# Patient Record
Sex: MALE | Race: WHITE | Employment: FULL TIME | URBAN - NONMETROPOLITAN AREA
[De-identification: names, ages, dates, MRNs, and addresses within clinical notes are randomized per-mention and may not be internally consistent; named-entity substitution may affect disease eponyms.]

---

## 2024-08-28 ENCOUNTER — APPOINTMENT (OUTPATIENT)
Dept: CT IMAGING | Age: 59
End: 2024-08-28
Payer: COMMERCIAL

## 2024-08-28 ENCOUNTER — HOSPITAL ENCOUNTER (EMERGENCY)
Age: 59
Discharge: HOME | End: 2024-08-28
Attending: EMERGENCY MEDICINE
Payer: COMMERCIAL

## 2024-08-28 VITALS
HEIGHT: 68 IN | HEART RATE: 87 BPM | BODY MASS INDEX: 28.79 KG/M2 | WEIGHT: 190 LBS | OXYGEN SATURATION: 99 % | TEMPERATURE: 98.3 F | SYSTOLIC BLOOD PRESSURE: 123 MMHG | DIASTOLIC BLOOD PRESSURE: 82 MMHG | RESPIRATION RATE: 17 BRPM

## 2024-08-28 DIAGNOSIS — F07.81 POST CONCUSSION SYNDROME: ICD-10-CM

## 2024-08-28 DIAGNOSIS — S09.90XA CLOSED HEAD INJURY, INITIAL ENCOUNTER: Primary | ICD-10-CM

## 2024-08-28 DIAGNOSIS — S09.93XA FACIAL INJURY, INITIAL ENCOUNTER: ICD-10-CM

## 2024-08-28 LAB
ANION GAP SERPL CALC-SCNC: 10 MEQ/L (ref 8–16)
BASOPHILS ABSOLUTE: 0.1 THOU/MM3 (ref 0–0.1)
BASOPHILS NFR BLD AUTO: 1 %
BUN SERPL-MCNC: 20 MG/DL (ref 7–22)
CALCIUM SERPL-MCNC: 9.2 MG/DL (ref 8.5–10.5)
CHLORIDE SERPL-SCNC: 101 MEQ/L (ref 98–111)
CO2 SERPL-SCNC: 28 MEQ/L (ref 23–33)
CREAT SERPL-MCNC: 0.8 MG/DL (ref 0.4–1.2)
DEPRECATED RDW RBC AUTO: 43.8 FL (ref 35–45)
EOSINOPHIL NFR BLD AUTO: 4.4 %
EOSINOPHILS ABSOLUTE: 0.2 THOU/MM3 (ref 0–0.4)
ERYTHROCYTE [DISTWIDTH] IN BLOOD BY AUTOMATED COUNT: 12.8 % (ref 11.5–14.5)
GFR SERPL CREATININE-BSD FRML MDRD: > 90 ML/MIN/1.73M2
GLUCOSE SERPL-MCNC: 99 MG/DL (ref 70–108)
HCT VFR BLD AUTO: 43.8 % (ref 42–52)
HGB BLD-MCNC: 14.6 GM/DL (ref 14–18)
IMM GRANULOCYTES # BLD AUTO: 0.01 THOU/MM3 (ref 0–0.07)
IMM GRANULOCYTES NFR BLD AUTO: 0.2 %
LYMPHOCYTES ABSOLUTE: 1 THOU/MM3 (ref 1–4.8)
LYMPHOCYTES NFR BLD AUTO: 19.1 %
MCH RBC QN AUTO: 31.1 PG (ref 26–33)
MCHC RBC AUTO-ENTMCNC: 33.3 GM/DL (ref 32.2–35.5)
MCV RBC AUTO: 93.2 FL (ref 80–94)
MONOCYTES ABSOLUTE: 0.5 THOU/MM3 (ref 0.4–1.3)
MONOCYTES NFR BLD AUTO: 10.5 %
NEUTROPHILS ABSOLUTE: 3.4 THOU/MM3 (ref 1.8–7.7)
NEUTROPHILS NFR BLD AUTO: 64.8 %
NRBC BLD AUTO-RTO: 0 /100 WBC
OSMOLALITY SERPL CALC.SUM OF ELEC: 280.2 MOSMOL/KG (ref 275–300)
PLATELET # BLD AUTO: 260 THOU/MM3 (ref 130–400)
PMV BLD AUTO: 9.1 FL (ref 9.4–12.4)
POTASSIUM SERPL-SCNC: 4.1 MEQ/L (ref 3.5–5.2)
RBC # BLD AUTO: 4.7 MILL/MM3 (ref 4.7–6.1)
SODIUM SERPL-SCNC: 139 MEQ/L (ref 135–145)
WBC # BLD AUTO: 5.2 THOU/MM3 (ref 4.8–10.8)

## 2024-08-28 PROCEDURE — 72125 CT NECK SPINE W/O DYE: CPT

## 2024-08-28 PROCEDURE — 93005 ELECTROCARDIOGRAM TRACING: CPT | Performed by: EMERGENCY MEDICINE

## 2024-08-28 PROCEDURE — 85025 COMPLETE CBC W/AUTO DIFF WBC: CPT

## 2024-08-28 PROCEDURE — 80048 BASIC METABOLIC PNL TOTAL CA: CPT

## 2024-08-28 PROCEDURE — 36415 COLL VENOUS BLD VENIPUNCTURE: CPT

## 2024-08-28 PROCEDURE — 70486 CT MAXILLOFACIAL W/O DYE: CPT

## 2024-08-28 PROCEDURE — 70450 CT HEAD/BRAIN W/O DYE: CPT

## 2024-08-28 PROCEDURE — 99284 EMERGENCY DEPT VISIT MOD MDM: CPT

## 2024-08-28 ASSESSMENT — PAIN DESCRIPTION - ORIENTATION: ORIENTATION: RIGHT;LEFT;POSTERIOR

## 2024-08-28 ASSESSMENT — PAIN - FUNCTIONAL ASSESSMENT
PAIN_FUNCTIONAL_ASSESSMENT: NONE - DENIES PAIN
PAIN_FUNCTIONAL_ASSESSMENT: 0-10
PAIN_FUNCTIONAL_ASSESSMENT: 0-10

## 2024-08-28 ASSESSMENT — PAIN DESCRIPTION - DESCRIPTORS: DESCRIPTORS: TIGHTNESS

## 2024-08-28 ASSESSMENT — PAIN DESCRIPTION - LOCATION: LOCATION: HEAD

## 2024-08-28 ASSESSMENT — PAIN SCALES - GENERAL
PAINLEVEL_OUTOF10: 5
PAINLEVEL_OUTOF10: 2

## 2024-08-28 NOTE — ED NOTES
Pt resting in bed with family at bedside, pt asks for ginger ale provider wants to hold off until CT comes back. Pt is breathng regular and unlabored on room air. Pt has pain on right side of face described as tightness rated 5/10 and posterior left side of head where laceration is. No other signs of distress to note at this time. Call light within reach.

## 2024-08-28 NOTE — ED TRIAGE NOTES
Pt to ED via personal vehicle c/o head injury. Son at bedside states pt was assisting cutting the limb of a tree when the limb broke free from the trunk and swung back and struck the pt on the head. Pt does not remember the incident. Pt is currently alert and oriented. Abrasion and small hematoma noted to right neck and side of head with dried blood in ear. Small laceration noted to left occiput with small amount of blood. Pt reports slight head pain 2/10, states he feels more \"pressure from the swelling\" to the right side of head. EKG completed. IV access established. Labs collected. Denies taking any daily medications.

## 2024-08-28 NOTE — ED PROVIDER NOTES
She denies she DOES NOT [facility] EMERGENCY DEPT      CHIEF COMPLAINT       Chief Complaint   Patient presents with    Head Injury       Nurses Notes reviewed and I agree except as noted in the HPI.      HISTORY OF PRESENT ILLNESS    Guanakito Olivarez is a 59 y.o. male who presents with complaint of pain to the right temple, patient stated that he was cutting a tree and a trailer and fell and hit him on the head, mostly striking his right temporal region, denies loss of consciousness.  He does not take blood thinners.  Patient is not intoxicated.  Onset: Acute  Duration: Prior to arrival  Timing: Single event  Location of Pain: Right face/temporal region  Intesity/severity: Moderate  Modifying Factors: Pain worse with opening his mouth  Relieved by;  Previous Episodes;  Tx Before arrival: None    PAST MEDICAL HISTORY    has no past medical history on file.    SURGICAL HISTORY      has no past surgical history on file.    CURRENT MEDICATIONS       There are no discharge medications for this patient.      ALLERGIES     has No Known Allergies.    FAMILY HISTORY     has no family status information on file.    family history is not on file.    SOCIAL HISTORY      reports that he has never smoked. He has never used smokeless tobacco.    PHYSICAL EXAM     INITIAL VITALS:  height is 1.727 m (5' 8\") and weight is 86.2 kg (190 lb). His oral temperature is 98.3 °F (36.8 °C). His blood pressure is 123/82 and his pulse is 87. His respiration is 17 and oxygen saturation is 99%.    Physical Exam   Constitutional:  well-developed and well-nourished.   HENT: Head: Normocephalic,  Bilateral external ears normal, Oropharynx mosit, No oral exudates, Nose normal.   Small puncture wound to left parietal scalp.  Patient also has no blood into the external auditory canal and behind the TM on the right side.  He has hematoma formation at the zygomatic arch region.  Eyes: PERRL, EOMI, Conjunctiva normal, No discharge. No scleral icterus  Neck:

## 2024-08-29 LAB
EKG ATRIAL RATE: 88 BPM
EKG P AXIS: 43 DEGREES
EKG P-R INTERVAL: 178 MS
EKG Q-T INTERVAL: 364 MS
EKG QRS DURATION: 84 MS
EKG QTC CALCULATION (BAZETT): 440 MS
EKG R AXIS: 51 DEGREES
EKG T AXIS: 46 DEGREES
EKG VENTRICULAR RATE: 88 BPM

## 2024-08-29 PROCEDURE — 93010 ELECTROCARDIOGRAM REPORT: CPT | Performed by: NUCLEAR MEDICINE

## 2024-08-29 NOTE — ED NOTES
Pt is resting in bed with family at bedside, no requests at this time. Pt is breathing regular and unlabored on room air. Pt states \"I don't have pain it feels like I have swimmer's ear\", no other signs of distress to note at this time. Call light within reach.

## 2024-09-17 ENCOUNTER — HOSPITAL ENCOUNTER (EMERGENCY)
Age: 59
Discharge: HOME | End: 2024-09-17
Payer: COMMERCIAL

## 2024-09-17 VITALS
DIASTOLIC BLOOD PRESSURE: 95 MMHG | OXYGEN SATURATION: 98 % | RESPIRATION RATE: 16 BRPM | TEMPERATURE: 98.5 F | HEIGHT: 67 IN | BODY MASS INDEX: 31.39 KG/M2 | HEART RATE: 81 BPM | SYSTOLIC BLOOD PRESSURE: 144 MMHG | WEIGHT: 200 LBS

## 2024-09-17 DIAGNOSIS — H69.93 DYSFUNCTION OF BOTH EUSTACHIAN TUBES: Primary | ICD-10-CM

## 2024-09-17 PROCEDURE — 99213 OFFICE O/P EST LOW 20 MIN: CPT

## 2024-09-17 PROCEDURE — 99213 OFFICE O/P EST LOW 20 MIN: CPT | Performed by: NURSE PRACTITIONER

## 2024-09-17 RX ORDER — LORATADINE PSEUDOEPHEDRINE SULFATE 10; 240 MG/1; MG/1
1 TABLET, EXTENDED RELEASE ORAL DAILY
Qty: 7 TABLET | Refills: 0 | Status: SHIPPED | OUTPATIENT
Start: 2024-09-17

## 2024-09-17 RX ORDER — PREDNISONE 20 MG/1
20 TABLET ORAL 2 TIMES DAILY
Qty: 10 TABLET | Refills: 0 | Status: SHIPPED | OUTPATIENT
Start: 2024-09-17 | End: 2024-09-22

## 2024-09-17 ASSESSMENT — PAIN DESCRIPTION - LOCATION: LOCATION: EAR

## 2024-09-17 ASSESSMENT — PAIN SCALES - GENERAL: PAINLEVEL_OUTOF10: 5

## 2024-09-17 ASSESSMENT — PAIN DESCRIPTION - DESCRIPTORS: DESCRIPTORS: PRESSURE

## 2024-09-17 ASSESSMENT — PAIN - FUNCTIONAL ASSESSMENT
PAIN_FUNCTIONAL_ASSESSMENT: 0-10
PAIN_FUNCTIONAL_ASSESSMENT: ACTIVITIES ARE NOT PREVENTED

## 2024-09-17 ASSESSMENT — PAIN DESCRIPTION - ORIENTATION: ORIENTATION: RIGHT;LEFT

## 2024-09-17 ASSESSMENT — PAIN DESCRIPTION - FREQUENCY: FREQUENCY: CONTINUOUS

## 2024-09-17 ASSESSMENT — ENCOUNTER SYMPTOMS: COUGH: 1

## 2025-01-20 SDOH — HEALTH STABILITY: PHYSICAL HEALTH: ON AVERAGE, HOW MANY DAYS PER WEEK DO YOU ENGAGE IN MODERATE TO STRENUOUS EXERCISE (LIKE A BRISK WALK)?: 3 DAYS

## 2025-01-20 SDOH — HEALTH STABILITY: PHYSICAL HEALTH: ON AVERAGE, HOW MANY MINUTES DO YOU ENGAGE IN EXERCISE AT THIS LEVEL?: 20 MIN

## 2025-01-22 ENCOUNTER — OFFICE VISIT (OUTPATIENT)
Dept: FAMILY MEDICINE CLINIC | Age: 60
End: 2025-01-22

## 2025-01-22 VITALS
RESPIRATION RATE: 12 BRPM | WEIGHT: 201.2 LBS | SYSTOLIC BLOOD PRESSURE: 120 MMHG | HEIGHT: 67 IN | DIASTOLIC BLOOD PRESSURE: 80 MMHG | TEMPERATURE: 96.8 F | OXYGEN SATURATION: 99 % | HEART RATE: 73 BPM | BODY MASS INDEX: 31.58 KG/M2

## 2025-01-22 DIAGNOSIS — Z00.00 VISIT FOR WELL MAN HEALTH CHECK: Primary | ICD-10-CM

## 2025-01-22 SDOH — ECONOMIC STABILITY: FOOD INSECURITY: WITHIN THE PAST 12 MONTHS, THE FOOD YOU BOUGHT JUST DIDN'T LAST AND YOU DIDN'T HAVE MONEY TO GET MORE.: NEVER TRUE

## 2025-01-22 SDOH — ECONOMIC STABILITY: FOOD INSECURITY: WITHIN THE PAST 12 MONTHS, YOU WORRIED THAT YOUR FOOD WOULD RUN OUT BEFORE YOU GOT MONEY TO BUY MORE.: NEVER TRUE

## 2025-01-22 ASSESSMENT — PATIENT HEALTH QUESTIONNAIRE - PHQ9
SUM OF ALL RESPONSES TO PHQ QUESTIONS 1-9: 0
1. LITTLE INTEREST OR PLEASURE IN DOING THINGS: NOT AT ALL
SUM OF ALL RESPONSES TO PHQ QUESTIONS 1-9: 0
SUM OF ALL RESPONSES TO PHQ9 QUESTIONS 1 & 2: 0
2. FEELING DOWN, DEPRESSED OR HOPELESS: NOT AT ALL

## 2025-01-22 ASSESSMENT — ENCOUNTER SYMPTOMS
VOMITING: 0
SHORTNESS OF BREATH: 0
ABDOMINAL PAIN: 0
NAUSEA: 0
DIARRHEA: 0
CONSTIPATION: 0

## 2025-01-22 NOTE — PROGRESS NOTES
Health Maintenance Due   Topic Date Due    Depression Screen  Never done    HIV screen  Never done    Hepatitis C screen  Never done    Hepatitis B vaccine (1 of 3 - 19+ 3-dose series) 01/26/1985    Colorectal Cancer Screen  Never done    Shingles vaccine (1 of 2) Never done    Flu vaccine (1) Never done       
S: 59 y.o. male with   Chief Complaint   Patient presents with    New Patient       The 10-year ASCVD risk score (Tony PALMA, et al., 2019) is: 8%    Values used to calculate the score:      Age: 59 years      Sex: Male      Is Non- : No      Diabetic: No      Tobacco smoker: No      Systolic Blood Pressure: 120 mmHg      Is BP treated: No      HDL Cholesterol: 39 mg/dL      Total Cholesterol: 201 mg/dL    Reviewed hx and ROS in detail with resident prior to exam.  See notes for additional details.     BP Readings from Last 3 Encounters:   01/22/25 120/80   09/17/24 (!) 144/95   08/28/24 123/82     Wt Readings from Last 3 Encounters:   01/22/25 91.3 kg (201 lb 3.2 oz)   09/17/24 90.7 kg (200 lb)   08/28/24 86.2 kg (190 lb)           O: VS:  height is 1.702 m (5' 7\") and weight is 91.3 kg (201 lb 3.2 oz). His temporal temperature is 96.8 °F (36 °C). His blood pressure is 120/80 and his pulse is 73. His respiration is 12 and oxygen saturation is 99%.       Health Maintenance Due   Topic Date Due    HIV screen  Never done    Hepatitis C screen  Never done    Colorectal Cancer Screen  Never done    Hepatitis B vaccine (2 of 3 - 19+ 3-dose series) 07/31/2014    Shingles vaccine (1 of 2) Never done       Colonoscopy was done at age 51, due again in 2 years  Last labs from 8/2024 reviewed and appropriate  Discussion regarding ASCVD 8%, ok for continued lifestyle mgmt at this time per patient preference      Attending Physician Statement  I have discussed the case, including pertinent history and exam findings with the resident. I agree with the documented assessment and plan as documented by the resident.  GC modifier added to this encounter      Katelyn Ann Leopold, MD 1/22/2025 4:10 PM      
120/80   Site: Left Upper Arm   Position: Sitting   Cuff Size: Medium Adult   Pulse: 73   Resp: 12   Temp: 96.8 °F (36 °C)   TempSrc: Temporal   SpO2: 99%   Weight: 91.3 kg (201 lb 3.2 oz)   Height: 1.702 m (5' 7\")         An electronic signature was used to authenticate this note. Parts of this note may have been dictated by use of voice recognition software and electronically transcribed. The note may contain errors not detected in proofreading.    --Vinny Finch MD

## 2025-06-05 ENCOUNTER — HOSPITAL ENCOUNTER (EMERGENCY)
Age: 60
Discharge: HOME | End: 2025-06-05
Payer: COMMERCIAL

## 2025-06-05 VITALS
HEART RATE: 78 BPM | TEMPERATURE: 98.7 F | DIASTOLIC BLOOD PRESSURE: 84 MMHG | RESPIRATION RATE: 20 BRPM | SYSTOLIC BLOOD PRESSURE: 137 MMHG | WEIGHT: 200 LBS | OXYGEN SATURATION: 97 % | BODY MASS INDEX: 31.32 KG/M2

## 2025-06-05 DIAGNOSIS — R21 RASH AND OTHER NONSPECIFIC SKIN ERUPTION: Primary | ICD-10-CM

## 2025-06-05 PROCEDURE — 99213 OFFICE O/P EST LOW 20 MIN: CPT

## 2025-06-05 RX ORDER — PREDNISONE 20 MG/1
40 TABLET ORAL DAILY
Qty: 10 TABLET | Refills: 0 | Status: SHIPPED | OUTPATIENT
Start: 2025-06-05 | End: 2025-06-10

## 2025-06-05 ASSESSMENT — ENCOUNTER SYMPTOMS
RESPIRATORY NEGATIVE: 1
GASTROINTESTINAL NEGATIVE: 1

## 2025-06-05 ASSESSMENT — PAIN - FUNCTIONAL ASSESSMENT: PAIN_FUNCTIONAL_ASSESSMENT: NONE - DENIES PAIN

## 2025-06-05 NOTE — ED PROVIDER NOTES
[facility]  UrgentCare Encounter      CHIEFCOMPLAINT       Chief Complaint   Patient presents with    Rash     Back of neck         Nurses Notes reviewed and I agree except as noted in the HPI.  HISTORY OF PRESENT ILLNESS   Guanakito Olivarez is a 60 y.o. male who presents today for rash on neck that started 2 days ago.  Patient states the only thing that has been different is he stated also room in [place].  Patient states he never went to the hotel he was not outside.  Patient denies having a rash anywhere else.    REVIEW OF SYSTEMS     Review of Systems   Constitutional: Negative.    HENT: Negative.     Respiratory: Negative.     Cardiovascular: Negative.    Gastrointestinal: Negative.    Skin:  Positive for rash.   Neurological: Negative.    Psychiatric/Behavioral: Negative.         PAST MEDICAL HISTORY         Diagnosis Date    GERD (gastroesophageal reflux disease)     H/O eye surgery     History of gastric ulcer     Hyperlipidemia        SURGICAL HISTORY     Patient  has no past surgical history on file.    CURRENT MEDICATIONS       Discharge Medication List as of 6/5/2025 11:24 AM          ALLERGIES     Patient is has no known allergies.    FAMILY HISTORY     Patient'sfamily history includes High Cholesterol in his mother.    SOCIAL HISTORY     Patient  reports that he has never smoked. He has never used smokeless tobacco. He reports that he does not drink alcohol and does not use drugs.    PHYSICAL EXAM     ED TRIAGE VITALS  BP: 137/84, Temp: 98.7 °F (37.1 °C), Pulse: 78, Respirations: 20, SpO2: 97 %  Physical Exam  Skin:     Findings: Rash present. Rash is macular and papular.             Comments: Erythematous hot maculopapular rash noted on back of neck         DIAGNOSTIC RESULTS   Labs:No results found for this visit on 06/05/25.    IMAGING:  No orders to display     URGENT CARE COURSE:         Medications - No data to display  PROCEDURES:  FINALIMPRESSION      1. Rash and other nonspecific skin  DISCHARGE

## 2025-06-05 NOTE — DISCHARGE INSTRUCTIONS
Medications as directed.  May use cool compresses to the area as needed.  May try Benadryl as needed for itching.  Follow-up with primary care provider for any new or worsening symptoms go to the emergency department for any other symptoms or concerns deemed emergent.

## 2025-06-05 NOTE — ED TRIAGE NOTES
Patient to room with c/o red, raised, itchy, sore rash to back of the neck beginning two days ago.

## 2025-08-11 ENCOUNTER — OFFICE VISIT (OUTPATIENT)
Dept: FAMILY MEDICINE CLINIC | Age: 60
End: 2025-08-11
Payer: COMMERCIAL

## 2025-08-11 VITALS
BODY MASS INDEX: 32.95 KG/M2 | WEIGHT: 205 LBS | TEMPERATURE: 98.4 F | HEIGHT: 66 IN | HEART RATE: 70 BPM | OXYGEN SATURATION: 98 % | RESPIRATION RATE: 18 BRPM | DIASTOLIC BLOOD PRESSURE: 80 MMHG | SYSTOLIC BLOOD PRESSURE: 120 MMHG

## 2025-08-11 DIAGNOSIS — M76.60 ACHILLES TENDON PAIN: Primary | ICD-10-CM

## 2025-08-11 DIAGNOSIS — Z12.11 COLON CANCER SCREENING: ICD-10-CM

## 2025-08-11 PROCEDURE — 99213 OFFICE O/P EST LOW 20 MIN: CPT

## 2025-08-11 ASSESSMENT — ENCOUNTER SYMPTOMS
SHORTNESS OF BREATH: 0
VOMITING: 0
DIARRHEA: 0
ABDOMINAL PAIN: 0
NAUSEA: 0
CONSTIPATION: 0

## 2025-08-14 ENCOUNTER — LAB (OUTPATIENT)
Dept: LAB | Age: 60
End: 2025-08-14

## 2025-08-14 ENCOUNTER — RESULTS FOLLOW-UP (OUTPATIENT)
Dept: FAMILY MEDICINE CLINIC | Age: 60
End: 2025-08-14

## 2025-08-14 DIAGNOSIS — Z00.00 VISIT FOR WELL MAN HEALTH CHECK: ICD-10-CM

## 2025-08-14 DIAGNOSIS — R89.9 ABNORMAL LABORATORY TEST RESULT: Primary | ICD-10-CM

## 2025-08-14 LAB
ANION GAP SERPL CALC-SCNC: 10 MEQ/L (ref 8–16)
BUN SERPL-MCNC: 19 MG/DL (ref 8–23)
CALCIUM SERPL-MCNC: 9.3 MG/DL (ref 8.5–10.5)
CHLORIDE SERPL-SCNC: 105 MEQ/L (ref 98–111)
CHOLESTEROL, FASTING: 170 MG/DL (ref 100–199)
CO2 SERPL-SCNC: 26 MEQ/L (ref 22–29)
CREAT SERPL-MCNC: 0.8 MG/DL (ref 0.7–1.2)
DEPRECATED RDW RBC AUTO: 43.7 FL (ref 35–45)
ERYTHROCYTE [DISTWIDTH] IN BLOOD BY AUTOMATED COUNT: 12.6 % (ref 11.5–14.5)
GFR SERPL CREATININE-BSD FRML MDRD: > 90 ML/MIN/1.73M2
GLUCOSE FASTING: 90 MG/DL (ref 74–109)
HCT VFR BLD AUTO: 42.5 % (ref 42–52)
HDLC SERPL-MCNC: 33 MG/DL
HGB BLD-MCNC: 14 GM/DL (ref 14–18)
LDLC SERPL CALC-MCNC: 115 MG/DL
MCH RBC QN AUTO: 30.9 PG (ref 26–33)
MCHC RBC AUTO-ENTMCNC: 32.9 GM/DL (ref 32.2–35.5)
MCV RBC AUTO: 93.8 FL (ref 80–94)
PLATELET # BLD AUTO: 259 THOU/MM3 (ref 130–400)
PMV BLD AUTO: 9.7 FL (ref 9.4–12.4)
POTASSIUM SERPL-SCNC: 4.4 MEQ/L (ref 3.5–5.2)
RBC # BLD AUTO: 4.53 MILL/MM3 (ref 4.7–6.1)
SODIUM SERPL-SCNC: 141 MEQ/L (ref 135–145)
TRIGLYCERIDE, FASTING: 112 MG/DL (ref 0–199)
TSH SERPL DL<=0.05 MIU/L-ACNC: 1.26 UIU/ML (ref 0.27–4.2)
WBC # BLD AUTO: 4.5 THOU/MM3 (ref 4.8–10.8)